# Patient Record
Sex: MALE | Race: WHITE | Employment: FULL TIME | URBAN - METROPOLITAN AREA
[De-identification: names, ages, dates, MRNs, and addresses within clinical notes are randomized per-mention and may not be internally consistent; named-entity substitution may affect disease eponyms.]

---

## 2017-04-17 ENCOUNTER — TRANSFERRED RECORDS (OUTPATIENT)
Dept: HEALTH INFORMATION MANAGEMENT | Facility: CLINIC | Age: 52
End: 2017-04-17

## 2018-04-01 ENCOUNTER — OFFICE VISIT (OUTPATIENT)
Dept: URGENT CARE | Facility: URGENT CARE | Age: 53
End: 2018-04-01
Payer: COMMERCIAL

## 2018-04-01 VITALS
WEIGHT: 221 LBS | BODY MASS INDEX: 29.97 KG/M2 | OXYGEN SATURATION: 98 % | RESPIRATION RATE: 18 BRPM | SYSTOLIC BLOOD PRESSURE: 155 MMHG | HEART RATE: 81 BPM | TEMPERATURE: 97.5 F | DIASTOLIC BLOOD PRESSURE: 96 MMHG

## 2018-04-01 DIAGNOSIS — E11.8 TYPE 2 DIABETES MELLITUS WITH COMPLICATION, WITHOUT LONG-TERM CURRENT USE OF INSULIN (H): ICD-10-CM

## 2018-04-01 DIAGNOSIS — R03.0 ELEVATED BP WITHOUT DIAGNOSIS OF HYPERTENSION: ICD-10-CM

## 2018-04-01 DIAGNOSIS — J06.9 ACUTE URI: Primary | ICD-10-CM

## 2018-04-01 PROCEDURE — 99203 OFFICE O/P NEW LOW 30 MIN: CPT | Performed by: PHYSICIAN ASSISTANT

## 2018-04-01 ASSESSMENT — ENCOUNTER SYMPTOMS
DIARRHEA: 0
MYALGIAS: 0
VOMITING: 0
SHORTNESS OF BREATH: 0
NAUSEA: 0
FOCAL WEAKNESS: 0
CHILLS: 0
HEADACHES: 0
FEVER: 1
ABDOMINAL PAIN: 0
DOUBLE VISION: 0
DYSURIA: 0
COUGH: 1

## 2018-04-01 NOTE — PROGRESS NOTES
HPI  April 1, 2018    HPI: Harsha Calderon is a 52 year old male with hx Type 2 DM who complains of moderate fatigue, fever, cough, & congestion onset 4 days ago. Tmax 101 F, no fever the past couple of days. Symptoms are constant in duration. Denies myalgias, HA, CP, SOB, abd pain, N/V/D, rash, or any other symptoms. Patient's friend has pneumonia recently.    DM is diet controlled.  Pt states his BP is elevated on & off and he is working on dietary changes for this.    Past Medical History:   Diagnosis Date     Allergic rhinitis     dust     Past Surgical History:   Procedure Laterality Date     SINUS SURGERY  2004     TONSILLECTOMY  1987     Social History   Substance Use Topics     Smoking status: Never Smoker     Smokeless tobacco: Never Used     Alcohol use 6.0 oz/week     12 Standard drinks or equivalent per week     Patient Active Problem List   Diagnosis     CARDIOVASCULAR SCREENING; LDL GOAL LESS THAN 160     Allergic rhinitis     Elevated BP without diagnosis of hypertension     Type 2 diabetes mellitus with complication, without long-term current use of insulin (H)     Family History   Problem Relation Age of Onset     DIABETES No family hx of      CANCER No family hx of      C.A.D. No family hx of         Problem list, Medication list, Allergies, and Medical/Social/Surgical histories reviewed in [facility] and updated as appropriate.      Review of Systems   Constitutional: Positive for fever and malaise/fatigue. Negative for chills.   HENT: Positive for congestion.    Eyes: Negative for double vision.   Respiratory: Positive for cough. Negative for shortness of breath.    Cardiovascular: Negative for chest pain.   Gastrointestinal: Negative for abdominal pain, diarrhea, nausea and vomiting.   Genitourinary: Negative for dysuria.   Musculoskeletal: Negative for myalgias.   Skin: Negative for rash.   Neurological: Negative for focal weakness and headaches.   All other systems reviewed and are  negative.      Physical Exam   Constitutional: He is oriented to person, place, and time and well-developed, well-nourished, and in no distress.   HENT:   Head: Normocephalic and atraumatic.   Right Ear: Tympanic membrane, external ear and ear canal normal.   Left Ear: Tympanic membrane, external ear and ear canal normal.   Nose: Nose normal.   Mouth/Throat: Uvula is midline, oropharynx is clear and moist and mucous membranes are normal.   Cardiovascular: Normal rate, regular rhythm and normal heart sounds.    Pulmonary/Chest: Effort normal and breath sounds normal.   Musculoskeletal: Normal range of motion.   Neurological: He is alert and oriented to person, place, and time. Gait normal.   Skin: Skin is warm and dry.   Nursing note and vitals reviewed.      Vital Signs  BP (!) 155/96 (BP Location: Right arm, Patient Position: Chair, Cuff Size: Adult Large)  Pulse 81  Temp 97.5  F (36.4  C) (Tympanic)  Resp 18  Wt 221 lb (100.2 kg)  SpO2 98%  BMI 29.97 kg/m2     Diagnostic Test Results:  none     ASSESSMENT/PLAN      ICD-10-CM    1. Acute URI J06.9    2. Elevated BP without diagnosis of hypertension R03.0    3. Type 2 diabetes mellitus with complication, without long-term current use of insulin (H) E11.8         Lungs CTAB, afebrile, no indication of pneumonia. Most c/w viral URI, supportive treatments discussed. Pt declines Rx for cough suppressant.    F/u with PCP for BP.  DM well controlled per patient.    I have discussed any lab or imaging results, the patient's diagnosis, and my plan of treatment with the patient and/or family. Patient is aware to come back in if with worsening symptoms or if no relief despite treatment plan.  Patient voiced understanding and had no further questions.       Follow Up: Return if symptoms worsen or fail to improve.    ONIEL Cuellar, [facility]

## 2018-04-01 NOTE — MR AVS SNAPSHOT
"              After Visit Summary   4/1/2018    Harsha Calderon    MRN: [id number]           Patient Information     Date Of Birth          1965        Visit Information        Provider Department      4/1/2018 12:25 PM Carole Whitfield PA-C [facility]        Today's Diagnoses     Acute URI    -  1    Elevated BP without diagnosis of hypertension        Type 2 diabetes mellitus with complication, without long-term current use of insulin (H)           Follow-ups after your visit        Follow-up notes from your care team     Return if symptoms worsen or fail to improve.      Who to contact     If you have questions or need follow up information about today's clinic visit or your schedule please contact [facility] directly at [phone].  Normal or non-critical lab and imaging results will be communicated to you by Caremergehart, letter or phone within 4 business days after the clinic has received the results. If you do not hear from us within 7 days, please contact the clinic through Caremergehart or phone. If you have a critical or abnormal lab result, we will notify you by phone as soon as possible.  Submit refill requests through YUPIQ or call your pharmacy and they will forward the refill request to us. Please allow 3 business days for your refill to be completed.          Additional Information About Your Visit        MyChart Information     YUPIQ lets you send messages to your doctor, view your test results, renew your prescriptions, schedule appointments and more. To sign up, go to [email or web address] . Click on \"Log in\" on the left side of the screen, which will take you to the Welcome page. Then click on \"Sign up Now\" on the right side of the page.     You will be asked to enter the access code listed below, as well as some personal information. Please follow the directions to create your username and password.     Your access code is: [email or web address]  Expires: " 2018  2:46 PM     Your access code will  in 90 days. If you need help or a new code, please call your [facility] clinic or [phone].        Care EveryWhere ID     This is your Care EveryWhere ID. This could be used by other organizations to access your [facility] medical records  [id number]        Your Vitals Were     Pulse Temperature Respirations Pulse Oximetry BMI (Body Mass Index)       81 97.5  F (36.4  C) (Tympanic) 18 98% 29.97 kg/m2        Blood Pressure from Last 3 Encounters:   18 (!) 155/96   11 130/84   03/15/10 140/76    Weight from Last 3 Encounters:   18 221 lb (100.2 kg)   11 210 lb (95.3 kg)   03/15/10 233 lb 6 oz (105.9 kg)              Today, you had the following     No orders found for display       Primary Care Provider Fax #    Physician No Ref-Primary [phone]       No address on file        Equal Access to Services     ESTRADA BAILEY : Hadii charline mendez hadasho Soomaali, waaxda luqadaha, qaybta kaalmada adeegyada, waxay andrey crews . So [facility] [phone].    ATENCIÓN: Si habla español, tiene a craig disposición servicios gratuitos de asistencia lingüística. Llame al [phone].    We comply with applicable federal civil rights laws and [place] laws. We do not discriminate on the basis of race, color, national origin, age, disability, sex, sexual orientation, or gender identity.            Thank you!     Thank you for choosing [facility]  for your care. Our goal is always to provide you with excellent care. Hearing back from our patients is one way we can continue to improve our services. Please take a few minutes to complete the written survey that you may receive in the mail after your visit with us. Thank you!             Your Updated Medication List - Protect others around you: Learn how to safely use, store and throw away your medicines at www.disposemymeds.org.      Notice  As of 2018  2:46 PM    You have not  been prescribed any medications.

## 2018-04-25 ENCOUNTER — TELEPHONE (OUTPATIENT)
Dept: OTHER | Facility: CLINIC | Age: 53
End: 2018-04-25

## 2018-04-25 NOTE — TELEPHONE ENCOUNTER
4/25/2018    Call Regarding Onboarding [facility] CHO[facility]     Attempt 1    Message on voicemail     Comments:       Outreach   SB

## 2018-05-22 NOTE — TELEPHONE ENCOUNTER
5/22/2018    [facility] Choices- on-boarded.         Outreach ,  Rory Klein

## 2018-06-04 ENCOUNTER — TELEPHONE (OUTPATIENT)
Dept: FAMILY MEDICINE | Facility: CLINIC | Age: 53
End: 2018-06-04

## 2018-06-04 ENCOUNTER — OFFICE VISIT (OUTPATIENT)
Dept: FAMILY MEDICINE | Facility: CLINIC | Age: 53
End: 2018-06-04
Payer: COMMERCIAL

## 2018-06-04 ENCOUNTER — APPOINTMENT (OUTPATIENT)
Dept: GENERAL RADIOLOGY | Facility: CLINIC | Age: 53
End: 2018-06-04
Attending: EMERGENCY MEDICINE
Payer: COMMERCIAL

## 2018-06-04 ENCOUNTER — HOSPITAL ENCOUNTER (EMERGENCY)
Facility: CLINIC | Age: 53
Discharge: HOME | End: 2018-06-04
Attending: EMERGENCY MEDICINE | Admitting: EMERGENCY MEDICINE
Payer: COMMERCIAL

## 2018-06-04 VITALS
HEIGHT: 72 IN | DIASTOLIC BLOOD PRESSURE: 106 MMHG | HEART RATE: 86 BPM | RESPIRATION RATE: 16 BRPM | WEIGHT: 233 LBS | TEMPERATURE: 98.3 F | SYSTOLIC BLOOD PRESSURE: 154 MMHG | BODY MASS INDEX: 31.56 KG/M2 | OXYGEN SATURATION: 95 %

## 2018-06-04 VITALS
TEMPERATURE: 97.4 F | HEIGHT: 72 IN | BODY MASS INDEX: 31.56 KG/M2 | HEART RATE: 81 BPM | DIASTOLIC BLOOD PRESSURE: 97 MMHG | SYSTOLIC BLOOD PRESSURE: 144 MMHG | OXYGEN SATURATION: 97 % | WEIGHT: 233 LBS

## 2018-06-04 DIAGNOSIS — I45.10 RIGHT BUNDLE BRANCH BLOCK: ICD-10-CM

## 2018-06-04 DIAGNOSIS — R07.9 ACUTE CHEST PAIN: Primary | ICD-10-CM

## 2018-06-04 DIAGNOSIS — R07.89 ATYPICAL CHEST PAIN: ICD-10-CM

## 2018-06-04 DIAGNOSIS — R03.0 ELEVATED BLOOD PRESSURE READING: ICD-10-CM

## 2018-06-04 LAB
ALBUMIN SERPL-MCNC: 4.2 G/DL (ref 3.4–5)
ALP SERPL-CCNC: 49 U/L (ref 40–150)
ALT SERPL W P-5'-P-CCNC: 37 U/L (ref 0–70)
ANION GAP SERPL CALCULATED.3IONS-SCNC: 6 MMOL/L (ref 3–14)
AST SERPL W P-5'-P-CCNC: 10 U/L (ref 0–45)
BASOPHILS # BLD AUTO: 0 10E9/L (ref 0–0.2)
BASOPHILS NFR BLD AUTO: 0.2 %
BILIRUB SERPL-MCNC: 0.4 MG/DL (ref 0.2–1.3)
BUN SERPL-MCNC: 17 MG/DL (ref 7–30)
CALCIUM SERPL-MCNC: 9 MG/DL (ref 8.5–10.1)
CHLORIDE SERPL-SCNC: 100 MMOL/L (ref 94–109)
CO2 SERPL-SCNC: 30 MMOL/L (ref 20–32)
CREAT SERPL-MCNC: 0.7 MG/DL (ref 0.66–1.25)
DIFFERENTIAL METHOD BLD: NORMAL
EOSINOPHIL # BLD AUTO: 0.1 10E9/L (ref 0–0.7)
EOSINOPHIL NFR BLD AUTO: 1.9 %
ERYTHROCYTE [DISTWIDTH] IN BLOOD BY AUTOMATED COUNT: 12.2 % (ref 10–15)
GFR SERPL CREATININE-BSD FRML MDRD: >90 ML/MIN/1.7M2
GLUCOSE SERPL-MCNC: 197 MG/DL (ref 70–99)
HCT VFR BLD AUTO: 42.8 % (ref 40–53)
HGB BLD-MCNC: 15.3 G/DL (ref 13.3–17.7)
IMM GRANULOCYTES # BLD: 0 10E9/L (ref 0–0.4)
IMM GRANULOCYTES NFR BLD: 0.5 %
LYMPHOCYTES # BLD AUTO: 2 10E9/L (ref 0.8–5.3)
LYMPHOCYTES NFR BLD AUTO: 31.5 %
MCH RBC QN AUTO: 30.7 PG (ref 26.5–33)
MCHC RBC AUTO-ENTMCNC: 35.7 G/DL (ref 31.5–36.5)
MCV RBC AUTO: 86 FL (ref 78–100)
MONOCYTES # BLD AUTO: 0.6 10E9/L (ref 0–1.3)
MONOCYTES NFR BLD AUTO: 8.9 %
NEUTROPHILS # BLD AUTO: 3.6 10E9/L (ref 1.6–8.3)
NEUTROPHILS NFR BLD AUTO: 57 %
NRBC # BLD AUTO: 0 10*3/UL
NRBC BLD AUTO-RTO: 0 /100
PLATELET # BLD AUTO: 169 10E9/L (ref 150–450)
POTASSIUM SERPL-SCNC: 4.2 MMOL/L (ref 3.4–5.3)
PROT SERPL-MCNC: 7.7 G/DL (ref 6.8–8.8)
RBC # BLD AUTO: 4.99 10E12/L (ref 4.4–5.9)
SODIUM SERPL-SCNC: 136 MMOL/L (ref 133–144)
TROPONIN I SERPL-MCNC: <0.015 UG/L (ref 0–0.04)
WBC # BLD AUTO: 6.3 10E9/L (ref 4–11)

## 2018-06-04 PROCEDURE — 93005 ELECTROCARDIOGRAM TRACING: CPT

## 2018-06-04 PROCEDURE — 80053 COMPREHEN METABOLIC PANEL: CPT | Performed by: EMERGENCY MEDICINE

## 2018-06-04 PROCEDURE — 71046 X-RAY EXAM CHEST 2 VIEWS: CPT

## 2018-06-04 PROCEDURE — 85025 COMPLETE CBC W/AUTO DIFF WBC: CPT | Performed by: EMERGENCY MEDICINE

## 2018-06-04 PROCEDURE — 99285 EMERGENCY DEPT VISIT HI MDM: CPT | Mod: 25

## 2018-06-04 PROCEDURE — 84484 ASSAY OF TROPONIN QUANT: CPT | Performed by: EMERGENCY MEDICINE

## 2018-06-04 PROCEDURE — 99213 OFFICE O/P EST LOW 20 MIN: CPT | Performed by: INTERNAL MEDICINE

## 2018-06-04 RX ORDER — PREDNISOLONE ACETATE 10 MG/ML
1 SUSPENSION/ DROPS OPHTHALMIC EVERY MORNING
Refills: 0 | COMMUNITY
Start: 2018-04-20

## 2018-06-04 RX ORDER — METFORMIN HCL 500 MG
1 TABLET, EXTENDED RELEASE 24 HR ORAL DAILY
COMMUNITY
Start: 2017-12-05

## 2018-06-04 RX ORDER — GLIPIZIDE 5 MG/1
1 TABLET, FILM COATED, EXTENDED RELEASE ORAL DAILY
Refills: 0 | COMMUNITY
Start: 2017-12-05

## 2018-06-04 ASSESSMENT — ENCOUNTER SYMPTOMS
LIGHT-HEADEDNESS: 0
VOMITING: 0
NAUSEA: 0
PALPITATIONS: 0
SHORTNESS OF BREATH: 0

## 2018-06-04 NOTE — ED PROVIDER NOTES
History     Chief Complaint:  Chest Pain    The history is provided by the patient.      Harsha Calderon is a hypertensive 52 year old type II diabetic male, who presents for evaluation of chest pain. The patient reports onset of mild epigastric chest pain that was intermittent for the last week until 2 days ago. 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 Patient does note that he was wearing multiple shirts, and that he did not feel that he was sweating excessively. He took Mylanta later that night, which he states alleviated his symptoms immediately. Patient has not felt the chest pain since Saturday night after taking Mylanta. Patient notes that he typically felt this chest pain around lunch time and while sitting down. Exercise does not seem to exacerbate his pain. Patient was evaluated in clinic earlier today and told to seek further evaluation here in the emergency department. Of note, the patient does have a stress test set up for 2 days from now that was ordered by his PCP.    Patient was diagnosed with type II diabetes ~1.5 years ago, and now takes Glipizide and Metformin 1x daily. Patient states that he only recently continued taking these medications 1 month ago as he had his diet under control and did not feel that he needed to. Patient does not take medication for his hypertension. Patient denies pain, pressure, or tightness on presentation, nausea, vomiting, discomfort with breathing, lightheadedness or dizziness, palpitations, swelling in legs or pain in calves, history of heart trouble or diabetes, or other complaint.    CARDIAC RISK FACTORS:  Sex:    Male  Tobacco:   negative  Hypertension:   positive  Hyperlipidemia:  negative  Diabetes:   positive  Family History:  negative     PE/DVT RISK FACTORS:  Hormones:   negative  Cancer:   negative    Travel:   Negative  Surgery:   Negative  Other immobilization: negative   Personal history:  negative     Allergies:  No known drug allergies     Medications:    Metformin  Prednisolone    Past Medical History:    Elevated BP w/o hypertension  Type II diabetes  Allergic rhinitis  CV screening    Past Surgical History:    Sinus surgery  Tonsillectomy    Family History:    GERD - mother and father    Social History:  Presents with spouse   Tobacco use: never smoker  Alcohol use: one drink / week  PCP: Physician No Ref-Primary     Marital Status:       Review of Systems   Respiratory: Negative for shortness of breath.    Cardiovascular: Positive for chest pain. Negative for palpitations and leg swelling.   Gastrointestinal: Negative for nausea and vomiting.   Neurological: Negative for light-headedness.   All other systems reviewed and are negative.    Physical Exam     Patient Vitals for the past 24 hrs:   BP Temp Pulse Resp SpO2 Height Weight   06/04/18 1719 (!) 173/103 98.3  F (36.8  C) 86 18 98 % 1.829 m (6') 105.7 kg (233 lb)      Physical Exam  General: Well appearing, nontoxic. Resting comfortably  Head:  Scalp, face, and head appear normal  Eyes:  Pupils are equal, round    Conjunctivae non-injected and sclerae white  ENT:    The external nose is normal    Pinnae are normal  Neck:  Normal range of motion    There is no rigidity noted    Trachea is in the midline  CV:  Regular rate and rhythm     Normal S1/S2, no S3/S4    No murmur or rub. Radial pulses 2+ bilaterally. Extremities warm and well perfused.  Resp:  Lungs are clear and equal bilaterally    There is no tachypnea    No increased work of breathing    No rales, wheezing, or rhonchi  GI:  Abdomen is soft, no rigidity or guarding    No distension, or mass    No tenderness or rebound tenderness   MS:  Normal muscular tone    Symmetric motor strength    No lower extremity edema, swelling or tenderness to palpation   Skin:  No rash or acute skin  lesions noted  Neuro: Awake and alert    Speech is normal and fluent    Moves all extremities spontaneously  Psych:  Normal affect.  Appropriate interactions.     Emergency Department Course   ECG (17:18:41):  Rate 83 bpm. OH interval 152. QRS duration 150. QT/QTc 384/462. P-R-T axes 50 67 27. Normal sinus rhythm. Right bundle branch block. Abnormal ECG. Agree with computer interpretation.  Interpreted at 1742 by Ab Huntley MD.     Imaging:  Radiographic findings were communicated with the patient and family who voiced understanding of the findings.    XR Chest, 2 views:  IMPRESSION: Negative.     Imaging independently reviewed and agree with radiologist interpretation.     Laboratory:  CBC: AWNL (WBC 6.3, HGB 15.3, )  CMP:  (H) o/w WNL (Creatinine 0.70)  1817: Troponin: <0.015       Emergency Department Course:  Past medical records, nursing notes, and vitals reviewed.  1728: I performed an exam of the patient and obtained history, as documented above.    The patient was sent for a chest x-ray while in the emergency department, findings above.   Blood drawn. This was sent to the lab for further testing, results above.   EKG was taken here in the ED, results as above.     1825: I rechecked the patient. Findings and plan explained to the Patient and spouse. Patient discharged home with instructions regarding supportive care, medications, and reasons to return. The importance of close follow-up was reviewed.      Impression & Plan      Medical Decision Making:  Harsha Calderon is a 52 year old male who presents for evaluation of chest pain.  This pain was intermittent through the last week, increased somewhat in intensity 2 days ago while the patient was moving band equipment, but since dissipated and has not returned after administration of Mylanta later that night. He denies any pain or discomfort today or yesterday. Patient low risk by HEART Score. CXR and troponin unremarkable. ECG with RBBB but  otherwise no evidence to suggest ischemia. There has been no progression of symptoms or other new concerning symptoms. A broad differential was of course considered. H&P not consistent with PE, Acute aortic emergency. No evidence of cardiomegaly, PTX, pneumonia or other acute disease. Discussed HEART score with patient and shared decision making regarding disposition and further workup was done. Patient is already scheduled for an outpatient stress echocardiogram in 2 days, and I encouraged him to keep this appointment. I feel that it is unlikely that his pain is cardiac in etiology. The patient agrees with the plan and all questions were answered. Return precautions were discussed with patient. The patient's questions were answered and the patient was agreeable with discharge.     Diagnosis:    ICD-10-CM   1. Atypical chest pain R07.89   2. Elevated blood pressure reading R03.0   3. Right bundle branch block I45.10       Disposition:  Discharged to home with plan as outlined.       Edson DELEON, am serving as a scribe at 5:37 PM on 6/4/2018 to document services personally performed by Ab Huntley MD based on my observations and the provider's statements to me.    6/4/2018    EMERGENCY DEPARTMENT     Ab Huntley MD  06/05/18 9091

## 2018-06-04 NOTE — ED AVS SNAPSHOT
Emergency Department    [address] [facility] [address]    Phone:  [phone]    Fax:  [phone]                                       Harsha Calderon   MRN: [id number]    Department:   Emergency Department   Date of Visit:  6/4/2018           Patient Information     Date Of Birth          1965        Your diagnoses for this visit were:     Atypical chest pain     Elevated blood pressure reading     Right bundle branch block        You were seen by Ab Huntley MD.      Follow-up Information     Follow up with Berkley Velasquez MD. Schedule an appointment as soon as possible for a visit in 3 days.    Specialty:  Internal Medicine    Why:  For close follow up    Contact information:    [address]  [facility] [id number] [phone]          Follow up with [facility]. Schedule an appointment as soon as possible for a visit in 3 days.    Why:  Keep your appointment for the stress test and call to follow up with a cardiologist.    Contact information:    [facility]  [address]  [address]  [place] [address]  Appointments: [phone]        Go to  Emergency Department.    Specialty:  EMERGENCY MEDICINE    Why:  If symptoms worsen    Contact information:    [address] [facility] [address] [phone]        Discharge Instructions       Start taking one baby aspirin (81mg) daily until you have your stress test and follow up with the cardiologist. Monitor your blood pressure and keep track of what your blood pressure is at home as this provides a more accurate measure and trend of what your blood pressure is when you are not at the doctor's office/hospital. Blood pressure monitors can be purchased and any pharmacy or the pharmacy will check it for you.    Discharge Instructions  Chest Pain    You have been seen today for chest pain or discomfort.  At this time, your provider has found no  signs that your chest pain is due to a serious or life-threatening condition, (or you have declined more testing and/or admission to the hospital). However, sometimes there is a serious problem that does not show up right away. Your evaluation today may not be complete and you may need further testing and evaluation.     Generally, every Emergency Department visit should have a follow-up clinic visit with either a primary or a specialty clinic/provider. Please follow-up as instructed by your emergency provider today.  Return to the Emergency Department if:    Your chest pain changes, gets worse, starts to happen more often, or comes with less activity.    You are newly short of breath.    You get very weak or tired.    You pass out or faint.    You have any new symptoms, like fever, cough, numb legs, or you cough up blood.    You have anything else that worries you.    Until you follow-up with your regular provider, please do the following:    Take one aspirin daily unless you have an allergy or are told not to by your provider.    If a stress test appointment has been made, go to the appointment.    If you have questions, contact your regular provider.    Follow-up with your regular provider/clinic as directed; this is very important.    If you were given a prescription for medicine here today, be sure to read all of the information (including the package insert) that comes with your prescription.  This will include important information about the medicine, its side effects, and any warnings that you need to know about.  The pharmacist who fills the prescription can provide more information and answer questions you may have about the medicine.  If you have questions or concerns that the pharmacist cannot address, please call or return to the Emergency Department.       Remember that you can always come back to the Emergency Department if you are not able to see your regular provider in the amount of time listed  above, if you get any new symptoms, or if there is anything that worries you.    Discharge Instructions  Hypertension - High Blood Pressure    During you visit to the Emergency Department, your blood pressure was higher than the recommended blood pressure.  This may be related to stress, pain, medication or other temporary conditions. In these cases, your blood pressure may return to normal on its own. If you have a history of high blood pressure, you may need to have your provider adjust your medications. Sometimes, your high measurement here may indicate that you have developed high blood pressure that will stay high unless it is treated. As a general rule, high blood pressure causes problems over years rather than days, weeks, or months. So, while it is important to treat blood pressure, it is rarely important to treat blood pressure immediately. Occasionally we will begin a medication in the Emergency Department; more often we will recommend close follow-up for medications with a primary doctor/clinic.    Generally, every Emergency Department visit should have a follow-up clinic visit with either a primary or a specialty clinic/provider. Please follow-up as instructed by your emergency provider today.    Return to the Emergency Department if you start to have:    A severe headache.    Chest pain.    Shortness of breath.    Weakness or numbness that affects one part of the body.    Confusion.    Vision changes.    Significant swelling of legs and/or eyes.    A reaction to any medication started in the Emergency Department.    What can I do to help myself?    Avoid alcohol.    Take any blood pressure medicine that you are prescribed.    Get a good night s sleep.    Lower your salt intake.    Exercise.    Lose weight.    Manage stress.    See your doctor regularly    If blood pressure medication was started in the Emergency Department:    The medicine may not have an immediate effect. The body and brain determine  what blood pressure you have. The medicine s job is to retrain the body s  thermostat  to a lower blood pressure.    You will need to follow up with your provider to see how this medicine is working for you.  If you were given a prescription for medicine here today, be sure to read all of the information (including the package insert) that comes with your prescription.  This will include important information about the medicine, its side effects, and any warnings that you need to know about.  The pharmacist who fills the prescription can provide more information and answer questions you may have about the medicine.  If you have questions or concerns that the pharmacist cannot address, please call or return to the Emergency Department.   Remember that you can always come back to the Emergency Department if you are not able to see your regular provider in the amount of time listed above, if you get any new symptoms, or if there is anything that worries you.        Your next 10 appointments already scheduled     Jun 06, 2018  9:00 AM CDT   New Visit with Sushil Vasquez MD   [facility] ([facility])    [address] [place] [address] [phone] OPT 2              24 Hour Appointment Hotline       To make an appointment at any [facility], call [phone] ([phone]). If you don't have a family doctor or clinic, we will help you find one. [facility] are conveniently located to serve the needs of you and your family.             Review of your medicines      Our records show that you are taking the medicines listed below. If these are incorrect, please call your family doctor or clinic.        Dose / Directions Last dose taken    glipiZIDE 5 MG 24 hr tablet   Commonly known as:  GLUCOTROL XL   Dose:  1 tablet        Take 1 tablet by mouth daily   Refills:  0        metFORMIN 500 MG 24 hr tablet   Commonly known as:  GLUCOPHAGE-XR    Dose:  1 tablet        Take 1 tablet by mouth daily   Refills:  0        prednisoLONE acetate 1 % ophthalmic susp   Commonly known as:  PRED FORTE   Dose:  1 drop        Place 1 drop into both eyes every morning   Refills:  0                Procedures and tests performed during your visit     CBC with platelets + differential    Comprehensive metabolic panel    EKG 12-lead, tracing only    Troponin I (now)    XR Chest 2 Views      Orders Needing Specimen Collection     None      Pending Results     Date and Time Order Name Status Description    6/4/2018 1753 XR Chest 2 Views Preliminary             Pending Culture Results     No orders found from 6/2/2018 to 6/5/2018.            Pending Results Instructions     If you had any lab results that were not finalized at the time of your Discharge, you can call the ED Lab Result RN at [phone]. You will be contacted by this team for any positive Lab results or changes in treatment. The nurses are available 7 days a week from 10A to 6:30P.  You can leave a message 24 hours per day and they will return your call.        Test Results From Your Hospital Stay        6/4/2018  6:01 PM      Component Results     Component Value Ref Range & Units Status    WBC 6.3 4.0 - 11.0 10e9/L Final    RBC Count 4.99 4.4 - 5.9 10e12/L Final    Hemoglobin 15.3 13.3 - 17.7 g/dL Final    Hematocrit 42.8 40.0 - 53.0 % Final    MCV 86 78 - 100 fl Final    MCH 30.7 26.5 - 33.0 pg Final    MCHC 35.7 31.5 - 36.5 g/dL Final    RDW 12.2 10.0 - 15.0 % Final    Platelet Count 169 150 - 450 10e9/L Final    Diff Method Automated Method  Final    % Neutrophils 57.0 % Final    % Lymphocytes 31.5 % Final    % Monocytes 8.9 % Final    % Eosinophils 1.9 % Final    % Basophils 0.2 % Final    % Immature Granulocytes 0.5 % Final    Nucleated RBCs 0 0 /100 Final    Absolute Neutrophil 3.6 1.6 - 8.3 10e9/L Final    Absolute Lymphocytes 2.0 0.8 - 5.3 10e9/L Final    Absolute Monocytes 0.6 0.0 - 1.3 10e9/L Final     Absolute Eosinophils 0.1 0.0 - 0.7 10e9/L Final    Absolute Basophils 0.0 0.0 - 0.2 10e9/L Final    Abs Immature Granulocytes 0.0 0 - 0.4 10e9/L Final    Absolute Nucleated RBC 0.0  Final         6/4/2018  6:17 PM      Component Results     Component Value Ref Range & Units Status    Sodium 136 133 - 144 mmol/L Final    Potassium 4.2 3.4 - 5.3 mmol/L Final    Chloride 100 94 - 109 mmol/L Final    Carbon Dioxide 30 20 - 32 mmol/L Final    Anion Gap 6 3 - 14 mmol/L Final    Glucose 197 (H) 70 - 99 mg/dL Final    Urea Nitrogen 17 7 - 30 mg/dL Final    Creatinine 0.70 0.66 - 1.25 mg/dL Final    GFR Estimate >90 >60 mL/min/1.7m2 Final    Non  GFR Calc    GFR Estimate If Black >90 >60 mL/min/1.7m2 Final    African American GFR Calc    Calcium 9.0 8.5 - 10.1 mg/dL Final    Bilirubin Total 0.4 0.2 - 1.3 mg/dL Final    Albumin 4.2 3.4 - 5.0 g/dL Final    Protein Total 7.7 6.8 - 8.8 g/dL Final    Alkaline Phosphatase 49 40 - 150 U/L Final    ALT 37 0 - 70 U/L Final    AST 10 0 - 45 U/L Final         6/4/2018  6:17 PM      Component Results     Component Value Ref Range & Units Status    Troponin I ES <0.015 0.000 - 0.045 ug/L Final    The 99th percentile for upper reference range is 0.045 ug/L.  Troponin values   in the range of 0.045 - 0.120 ug/L may be associated with risks of adverse   clinical events.           6/4/2018  6:13 PM      Narrative     CHEST TWO VIEWS     6/4/2018 6:11 PM     HISTORY: Chest pain.    COMPARISON: None.    FINDINGS: Heart size normal. Lungs clear.        Impression     IMPRESSION: Negative.                 Clinical Quality Measure: Blood Pressure Screening     Your blood pressure was checked while you were in the emergency department today. The last reading we obtained was  BP: (!) 173/103 . Please read the guidelines below about what these numbers mean and what you should do about them.  If your systolic blood pressure (the top number) is less than 120 and your diastolic blood  pressure (the bottom number) is less than 80, then your blood pressure is normal. There is nothing more that you need to do about it.  If your systolic blood pressure (the top number) is 120-139 or your diastolic blood pressure (the bottom number) is 80-89, your blood pressure may be higher than it should be. You should have your blood pressure rechecked within a year by a primary care provider.  If your systolic blood pressure (the top number) is 140 or greater or your diastolic blood pressure (the bottom number) is 90 or greater, you may have high blood pressure. High blood pressure is treatable, but if left untreated over time it can put you at risk for heart attack, stroke, or kidney failure. You should have your blood pressure rechecked by a primary care provider within the next 4 weeks.  If your provider in the emergency department today gave you specific instructions to follow-up with your doctor or provider even sooner than that, you should follow that instruction and not wait for up to 4 weeks for your follow-up visit.        Thank you for choosing [facility]       Thank you for choosing [facility] for your care. Our goal is always to provide you with excellent care. Hearing back from our patients is one way we can continue to improve our services. Please take a few minutes to complete the written survey that you may receive in the mail after you visit with us. Thank you!        [employer] Information     [employer] gives you secure access to your electronic health record. If you see a primary care provider, you can also send messages to your care team and make appointments. If you have questions, please call your primary care clinic.  If you do not have a primary care provider, please call [phone] and they will assist you.        Care EveryWhere ID     This is your Care EveryWhere ID. This could be used by other organizations to access your [facility] medical records  [id number]        Equal Access to Services      LEONID BAILEY : Hadii charline Burroughs, waaxda luqadaha, qaybta kaalmada reshma, daisha osman. So [facility] [phone].    ATENCIÓN: Si habla español, tiene a craig disposición servicios gratuitos de asistencia lingüística. Llame al [phone].    We comply with applicable federal civil rights laws and [place] laws. We do not discriminate on the basis of race, color, national origin, age, disability, sex, sexual orientation, or gender identity.            After Visit Summary       This is your record. Keep this with you and show to your community pharmacist(s) and doctor(s) at your next visit.

## 2018-06-04 NOTE — MR AVS SNAPSHOT
After Visit Summary   6/4/2018    Harsha Calderon    MRN: [id number]           Patient Information     Date Of Birth          1965        Visit Information        Provider Department      6/4/2018 4:40 PM Berkley Velasquez MD [facility]        Today's Diagnoses     Acute chest pain    -  1       Follow-ups after your visit        Additional Services     CARDIO  ADULT REFERRAL       [facility] is referring you to Cardiology Services.       The  Representative will assist you in the coordination of your Cardiology care as prescribed by your physician.    The  Representative will call you within 24 hours to help schedule your appointment, or you may contact the  Representative at: [phone].         Type of Referral: New Cardiology Referral            Timeframe requested: 1-2 days       Coverage of these services is subject to the terms and limitations of your health insurance plan.  Please call member services at your health plan with any benefit or coverage questions.      If X-rays, CT or MRI's have been performed, please contact the facility where they were done to arrange for , prior to your scheduled appointment.  Please bring this referral request to your appointment and present it to your specialist.                  Your next 10 appointments already scheduled     Jun 06, 2018  9:00 AM CDT   New Visit with Sushil Vasquez MD   [facility] ([facility])    [address] [place] [address] [phone] OPT 2              Who to contact     If you have questions or need follow up information about today's clinic visit or your schedule please contact [facility] directly at [phone].  Normal or non-critical lab and imaging results will be communicated to you by MyChart, letter or phone within 4 business days after the clinic has received the  results. If you do not hear from us within 7 days, please contact the clinic through [employer] or phone. If you have a critical or abnormal lab result, we will notify you by phone as soon as possible.  Submit refill requests through [employer] or call your pharmacy and they will forward the refill request to us. Please allow 3 business days for your refill to be completed.          Additional Information About Your Visit        [email or web address] Information     [employer] gives you secure access to your electronic health record. If you see a primary care provider, you can also send messages to your care team and make appointments. If you have questions, please call your primary care clinic.  If you do not have a primary care provider, please call [phone] and they will assist you.        Care EveryWhere ID     This is your Care EveryWhere ID. This could be used by other organizations to access your [facility] medical records  [id number]        Your Vitals Were     Pulse Temperature Height Pulse Oximetry BMI (Body Mass Index)       81 97.4  F (36.3  C) (Tympanic) 6' (1.829 m) 97% 31.6 kg/m2        Blood Pressure from Last 3 Encounters:   06/04/18 (!) 144/97   04/01/18 (!) 155/96   08/25/11 130/84    Weight from Last 3 Encounters:   06/04/18 233 lb (105.7 kg)   04/01/18 221 lb (100.2 kg)   08/25/11 210 lb (95.3 kg)              We Performed the Following     CARDIO  ADULT REFERRAL        Primary Care Provider Fax #    Physician No Ref-Primary [phone]       No address on file        Equal Access to Services     [facility] : Hadii aad ku hadasho Soomaali, waaxda luqadaha, qaybta kaalmada adeegyada, waxay andrey crews . So [facility] [phone].    ATENCIÓN: Si habla español, tiene a craig disposición servicios gratuitos de asistencia lingüística. Llame al [phone].    We comply with applicable federal civil rights laws and [place] laws. We do not discriminate on the basis of race, color, national  origin, age, disability, sex, sexual orientation, or gender identity.            Thank you!     Thank you for choosing [facility]  for your care. Our goal is always to provide you with excellent care. Hearing back from our patients is one way we can continue to improve our services. Please take a few minutes to complete the written survey that you may receive in the mail after your visit with us. Thank you!             Your Updated Medication List - Protect others around you: Learn how to safely use, store and throw away your medicines at www.disposemymeds.org.          This list is accurate as of 6/4/18  5:08 PM.  Always use your most recent med list.                   Brand Name Dispense Instructions for use Diagnosis    glipiZIDE 5 MG 24 hr tablet    GLUCOTROL XL     Take 1 tablet by mouth daily        metFORMIN 500 MG 24 hr tablet    GLUCOPHAGE-XR     Take 1 tablet by mouth daily        prednisoLONE acetate 1 % ophthalmic susp    PRED FORTE     Place 1 drop into both eyes every morning

## 2018-06-04 NOTE — TELEPHONE ENCOUNTER
Transferred from Pt Reps:    S: Pt c/o chest pains throughout this week. Not currently having symptoms    B: Was previously dx of Type II Diabetes- Takes glipizide and metformin daily for 2 weeks.     Was previously seen by [facility]     A:     Pt noticed a tightness in chest on Saturday with some sweating while he was moving band equipment.    Did not feel he was over-exerting himself.     Denies shortness of breath with episode  Denies lightheaded/dizziness with episode    Location: Mid-sternal   Denies radiation, denies nausea/vomiting   Alleviating: MyLanta, lying down  Aggravating: unsure of what brought on episodes     Pt states he drinks a lot of soda and spicy foods    R: OV today with Dr. Velasquez, he is currently asymptomatic. If symptoms return, contact clinic. If CP returns with SOB, radiation, N/V. Call 911.     Offered earlier appt, but pt does not currently have a vehicle. His is in the shop until noon.    Tamika NEGRETE RN

## 2018-06-04 NOTE — DISCHARGE INSTRUCTIONS
Start taking one baby aspirin (81mg) daily until you have your stress test and follow up with the cardiologist. Monitor your blood pressure and keep track of what your blood pressure is at home as this provides a more accurate measure and trend of what your blood pressure is when you are not at the doctor's office/hospital. Blood pressure monitors can be purchased and any pharmacy or the pharmacy will check it for you.    Discharge Instructions  Chest Pain    You have been seen today for chest pain or discomfort.  At this time, your provider has found no signs that your chest pain is due to a serious or life-threatening condition, (or you have declined more testing and/or admission to the hospital). However, sometimes there is a serious problem that does not show up right away. Your evaluation today may not be complete and you may need further testing and evaluation.     Generally, every Emergency Department visit should have a follow-up clinic visit with either a primary or a specialty clinic/provider. Please follow-up as instructed by your emergency provider today.  Return to the Emergency Department if:    Your chest pain changes, gets worse, starts to happen more often, or comes with less activity.    You are newly short of breath.    You get very weak or tired.    You pass out or faint.    You have any new symptoms, like fever, cough, numb legs, or you cough up blood.    You have anything else that worries you.    Until you follow-up with your regular provider, please do the following:    Take one aspirin daily unless you have an allergy or are told not to by your provider.    If a stress test appointment has been made, go to the appointment.    If you have questions, contact your regular provider.    Follow-up with your regular provider/clinic as directed; this is very important.    If you were given a prescription for medicine here today, be sure to read all of the information (including the package insert)  that comes with your prescription.  This will include important information about the medicine, its side effects, and any warnings that you need to know about.  The pharmacist who fills the prescription can provide more information and answer questions you may have about the medicine.  If you have questions or concerns that the pharmacist cannot address, please call or return to the Emergency Department.       Remember that you can always come back to the Emergency Department if you are not able to see your regular provider in the amount of time listed above, if you get any new symptoms, or if there is anything that worries you.    Discharge Instructions  Hypertension - High Blood Pressure    During you visit to the Emergency Department, your blood pressure was higher than the recommended blood pressure.  This may be related to stress, pain, medication or other temporary conditions. In these cases, your blood pressure may return to normal on its own. If you have a history of high blood pressure, you may need to have your provider adjust your medications. Sometimes, your high measurement here may indicate that you have developed high blood pressure that will stay high unless it is treated. As a general rule, high blood pressure causes problems over years rather than days, weeks, or months. So, while it is important to treat blood pressure, it is rarely important to treat blood pressure immediately. Occasionally we will begin a medication in the Emergency Department; more often we will recommend close follow-up for medications with a primary doctor/clinic.    Generally, every Emergency Department visit should have a follow-up clinic visit with either a primary or a specialty clinic/provider. Please follow-up as instructed by your emergency provider today.    Return to the Emergency Department if you start to have:    A severe headache.    Chest pain.    Shortness of breath.    Weakness or numbness that affects one  part of the body.    Confusion.    Vision changes.    Significant swelling of legs and/or eyes.    A reaction to any medication started in the Emergency Department.    What can I do to help myself?    Avoid alcohol.    Take any blood pressure medicine that you are prescribed.    Get a good night s sleep.    Lower your salt intake.    Exercise.    Lose weight.    Manage stress.    See your doctor regularly    If blood pressure medication was started in the Emergency Department:    The medicine may not have an immediate effect. The body and brain determine what blood pressure you have. The medicine s job is to retrain the body s  thermostat  to a lower blood pressure.    You will need to follow up with your provider to see how this medicine is working for you.  If you were given a prescription for medicine here today, be sure to read all of the information (including the package insert) that comes with your prescription.  This will include important information about the medicine, its side effects, and any warnings that you need to know about.  The pharmacist who fills the prescription can provide more information and answer questions you may have about the medicine.  If you have questions or concerns that the pharmacist cannot address, please call or return to the Emergency Department.   Remember that you can always come back to the Emergency Department if you are not able to see your regular provider in the amount of time listed above, if you get any new symptoms, or if there is anything that worries you.

## 2018-06-04 NOTE — PROGRESS NOTES
SUBJECTIVE:   Harsha Calderon is a 52 year old male who presents to clinic today for the following health issues:      Chest pain      Chest Pain    Duration:     Since: last Saturday 2 days ago           Specific cause: exertion    Description:      Location of pain: left chest     Character of pain: dull     Pain radiation: none    Intensity: moderate    History:      Pain interferes with job: yes     History of similar pain problems: no     Any previous MRI or X-rays: none     Therapies tried without relief: mylanta    Alleviating factors:      Improved by: rest    Precipitating factors:    Worsened by: exertion    Accompanying Signs & Symptoms: none            Current Medications:     Current Outpatient Prescriptions   Medication Sig Dispense Refill     glipiZIDE (GLUCOTROL XL) 5 MG 24 hr tablet Take 1 tablet by mouth daily  0     metFORMIN (GLUCOPHAGE-XR) 500 MG 24 hr tablet Take 1 tablet by mouth daily       prednisoLONE acetate (PRED FORTE) 1 % ophthalmic susp Place 1 drop into both eyes every morning  0         Allergies:    No Known Allergies         Past Medical History:     Past Medical History:   Diagnosis Date     Allergic rhinitis     dust     Diabetes (H)          Past Surgical History:     Past Surgical History:   Procedure Laterality Date     SINUS SURGERY  2004     TONSILLECTOMY  1987         Family Medical History:     Family History   Problem Relation Age of Onset     DIABETES No family hx of      CANCER No family hx of      C.A.D. No family hx of          Social History:     Social History     Social History     Marital status:      Spouse name: Hilary Smith     Number of children: 0     Years of education: 14     Occupational History      [employer]     Social History Main Topics     Smoking status: Never Smoker     Smokeless tobacco: Never Used     Alcohol use 6.0 oz/week     12 Standard drinks or equivalent per week      Comment: variable      Drug use: No      Sexual activity: Yes     Partners: Female     Other Topics Concern     Not on file     Social History Narrative           Review of System:     Constitutional: Negative for fever or chills  Skin: Negative for rashes  Ears/Nose/Throat: Negative for nasal congestion, sore throat  Respiratory: No shortness of breath, dyspnea on exertion, cough, or hemoptysis  Cardiovascular: Positive for chest pain  Gastrointestinal: Negative for nausea, vomiting  Genitourinary: Negative for dysuria, hematuria  Musculoskeletal: Negative for myalgias  Neurologic: Negative for headaches  Psychiatric: Negative for depression, anxiety  Hematologic/Lymphatic/Immunologic: Negative  Endocrine: Negative  Behavioral: Negative for tobacco use       Physical Exam:   BP (!) 144/97 (BP Location: Right arm, Patient Position: Chair, Cuff Size: Adult Large)  Pulse 81  Temp 97.4  F (36.3  C) (Tympanic)  Ht 6' (1.829 m)  Wt 233 lb (105.7 kg)  SpO2 97%  BMI 31.6 kg/m2    GENERAL: alert and no distress  EYES: eyes grossly normal to inspection, and conjunctivae and sclerae normal  HENT: Normocephalic atraumatic. Nose and mouth without ulcers or lesions  NECK: supple  RESP: lungs clear to auscultation   CV: regular rate and rhythm, normal S1 S2  LYMPH: no peripheral edema   ABDOMEN: nondistended  MS: no gross musculoskeletal defects noted  SKIN: no suspicious lesions or rashes  NEURO: Alert & Oriented x 3.   PSYCH: mentation appears normal, affect normal        ASSESSMENT/PLAN:       (R07.9) Acute chest pain  (primary encounter diagnosis)  Comment: new chest pain on Saturday with exertion concerning for angina symptoms.  Plan: I have ordered CARDIO  ADULT REFERRAL and transferred the patient to the [facility] ER today for further evaluation and management going forward.      Follow Up Plan:     Patient will be transferred from the Internal Medicine clinic to the [facility] ER today for further evaluation and management of  his chest pains symptoms going forward      Berkley Velasquez MD  Internal Medicine  [facility]

## 2018-06-04 NOTE — ED AVS SNAPSHOT
Emergency Department    [address] [place] [id number]    Phone:  [phone]    Fax:  [phone]                                       Harsha Calderon   MRN: [id number]    Department:   Emergency Department   Date of Visit:  6/4/2018           After Visit Summary Signature Page     I have received my discharge instructions, and my questions have been answered. I have discussed any challenges I see with this plan with the nurse or doctor.    ..........................................................................................................................................  Patient/Patient Representative Signature      ..........................................................................................................................................  Patient Representative Print Name and Relationship to Patient    ..................................................               ................................................  Date                                            Time    ..........................................................................................................................................  Reviewed by Signature/Title    ...................................................              ..............................................  Date                                                            Time

## 2018-06-05 ENCOUNTER — TELEPHONE (OUTPATIENT)
Dept: CARDIOLOGY | Facility: CLINIC | Age: 53
End: 2018-06-05

## 2018-06-05 LAB — INTERPRETATION ECG - MUSE: NORMAL

## 2018-06-05 NOTE — TELEPHONE ENCOUNTER
Called and spoke with patient. 5/4/18 note from Dr. Velasquez, noted patient was to have a stress test done in 2 days. Patient states he was not told to have a stress test, just cardiology f//u. No order for stress test found. Will have him discuss with Dr. Vasquez.

## 2018-06-06 ENCOUNTER — OFFICE VISIT (OUTPATIENT)
Dept: CARDIOLOGY | Facility: CLINIC | Age: 53
End: 2018-06-06
Payer: COMMERCIAL

## 2018-06-06 VITALS
SYSTOLIC BLOOD PRESSURE: 143 MMHG | DIASTOLIC BLOOD PRESSURE: 92 MMHG | BODY MASS INDEX: 31.72 KG/M2 | HEART RATE: 82 BPM | HEIGHT: 72 IN | WEIGHT: 234.2 LBS

## 2018-06-06 DIAGNOSIS — R07.89 ATYPICAL CHEST PAIN: ICD-10-CM

## 2018-06-06 DIAGNOSIS — I45.10 RIGHT BUNDLE BRANCH BLOCK: Primary | ICD-10-CM

## 2018-06-06 DIAGNOSIS — R03.0 ELEVATED BP WITHOUT DIAGNOSIS OF HYPERTENSION: ICD-10-CM

## 2018-06-06 DIAGNOSIS — R07.89 OTHER CHEST PAIN: ICD-10-CM

## 2018-06-06 PROCEDURE — 99204 OFFICE O/P NEW MOD 45 MIN: CPT | Performed by: INTERNAL MEDICINE

## 2018-06-06 NOTE — MR AVS SNAPSHOT
After Visit Summary   6/6/2018    Harsha Calderon    MRN: [id number]           Patient Information     Date Of Birth          1965        Visit Information        Provider Department      6/6/2018 9:00 AM Sushil Vasquez MD [facility]        Today's Diagnoses     Right bundle branch block    -  1    Elevated BP without diagnosis of hypertension        Other chest pain        Atypical chest pain           Follow-ups after your visit        Future tests that were ordered for you today     Open Future Orders        Priority Expected Expires Ordered    Exercise Stress Echocardiogram Routine 6/13/2018 6/6/2019 6/6/2018            Who to contact     If you have questions or need follow up information about today's clinic visit or your schedule please contact [facility] directly at [phone].  Normal or non-critical lab and imaging results will be communicated to you by MyChart, letter or phone within 4 business days after the clinic has received the results. If you do not hear from us within 7 days, please contact the clinic through [employer] or phone. If you have a critical or abnormal lab result, we will notify you by phone as soon as possible.  Submit refill requests through Blink or call your pharmacy and they will forward the refill request to us. Please allow 3 business days for your refill to be completed.          Additional Information About Your Visit        MyChart Information     Blink gives you secure access to your electronic health record. If you see a primary care provider, you can also send messages to your care team and make appointments. If you have questions, please call your primary care clinic.  If you do not have a primary care provider, please call [phone] and they will assist you.        Care EveryWhere ID     This is your Care EveryWhere ID. This could be used by other organizations to  access your [facility] medical records  [id number]        Your Vitals Were     Pulse Height BMI (Body Mass Index)             82 1.829 m (6') 31.76 kg/m2          Blood Pressure from Last 3 Encounters:   06/06/18 (!) 143/92   06/04/18 (!) 154/106   06/04/18 (!) 144/97    Weight from Last 3 Encounters:   06/06/18 106.2 kg (234 lb 3.2 oz)   06/04/18 105.7 kg (233 lb)   06/04/18 105.7 kg (233 lb)               Primary Care Provider Fax #    Physician No Ref-Primary [phone]       No address on file        Equal Access to Services     LEONID BAILEY : Abhijit Burroughs, boston martel, jewell njalmaemmanuel justice, daisha crews . So [facility] [phone].    ATENCIÓN: Si habla español, tiene a craig disposición servicios gratuitos de asistencia lingüística. Llame al [phone].    We comply with applicable federal civil rights laws and [place] laws. We do not discriminate on the basis of race, color, national origin, age, disability, sex, sexual orientation, or gender identity.            Thank you!     Thank you for choosing [facility]  for your care. Our goal is always to provide you with excellent care. Hearing back from our patients is one way we can continue to improve our services. Please take a few minutes to complete the written survey that you may receive in the mail after your visit with us. Thank you!             Your Updated Medication List - Protect others around you: Learn how to safely use, store and throw away your medicines at www.disposemymeds.org.          This list is accurate as of 6/6/18  9:42 AM.  Always use your most recent med list.                   Brand Name Dispense Instructions for use Diagnosis    glipiZIDE 5 MG 24 hr tablet    GLUCOTROL XL     Take 1 tablet by mouth daily        metFORMIN 500 MG 24 hr tablet    GLUCOPHAGE-XR     Take 1 tablet by mouth daily        prednisoLONE acetate 1 % ophthalmic susp    PRED  FORTE     Place 1 drop into both eyes every morning

## 2018-06-06 NOTE — PROGRESS NOTES
Service Date: 06/06/2018      HISTORY OF PRESENT ILLNESS:  Vlad Calderon is 52 and he was referred by the emergency room after he went to the ER 2 days ago for a recent pattern of new-onset chest discomfort.  His symptoms tend to be upper chest.  They are non-radiating.  He cannot make them come.  He cannot necessarily make them go away.  They can last for over an hour.      The symptoms do not go to the neck, jaw, arms or back.  They do not seem to be exertional.  He had no other associated symptoms other than once he felt a little sweaty but at the time, he was working hard with some extra layers of clothes on, so it was not clear that there was a cause and effect.      He has never had heart disease or known coronary disease in the past.  He has been diabetic for a year and a half, treated with diet and with oral medications.      SOCIAL HISTORY:  He is , is not currently exercising much.  He does not have children.  He lives in an apartment and life has been hectic and busy so he has been physically less active.      FAMILY HISTORY:  Negative for premature coronary disease.      REVIEW OF SYSTEMS:  Negative for a 10-point review other than his diabetes as noted.      PHYSICAL EXAMINATION:   GENERAL:  Shows a well-developed, well-nourished male.  He has a little bit of some extra weight in the abdomen.   VITAL SIGNS:  Blood pressure was 140/90, heart rate 80.  He weighed 234 pounds.  Seven years ago, he weighed 210 pounds.  He says he has been exercising less and enjoying food more.  He has been on and off with taking good care of his diabetes and watching his carbohydrates and he has not been exercising substantially.   HEAD:  Normal.   NECK:  Free of neck vein distention, mass, bruit or goiter.  No adenopathy noted.   HEART:  Regular without gallop, murmur, rub or click.  Chest wall is nontender to vigorous palpation.   LUNGS:  Clear to auscultation.   ABDOMEN:  Soft without organomegaly, mass, pain or  "guarding.   EXTREMITIES:  Show +2 dorsalis pedis pulses, no edema.       Vlad Calderon is 52.  He is a hair overweight.  It looks like he has a little bit of a tendency to hypertension.  He is a non-insulin dependent diabetic, currently controlled on glipizide 5 mg a day and metformin 500 mg a day.  He is also taking prednisone for an eye issue but they are eyedrops.      His symptoms, I feel, are more atypical than typical of angina.  He does use the word \"tightness\" in the upper chest but it is not exertional and nonradiating.  He says he took an antacid not long ago and the pain went away about an hour later.  All this is a bit nondiagnostic in terms of the etiology of his chest discomfort.        His EKG showed sinus rhythm with a right bundle branch block.        With his visit to the emergency room, his laboratory work was normal.  Creatinine 0.7.  Troponins negative.  Hemoglobin 15 grams, white count 6000.  Electrolytes normal.      I have ordered a stress echo. I feel it will be normal.  His symptoms are atypical and I do not feel that they are due to significant coronary artery disease.  Should the study be as I expect, totally normal, I do not feel we need further cardiac testing.  I told him that I thought a GI assessment might have some value.      Should the test be blatantly abnormal or nondiagnostic, it may merit further discussion and assessment.        IMPRESSION:  For the time being, my impressions were:    1.  Noncardiac atypical chest discomfort, possibly GI in origin but I strongly doubt coronary artery disease/angina.   2.  Adult-onset non-insulin dependent diabetes.   3.  Lipid status unclear.   4.  Normal renal function.      PLAN:  Stress echo followed by prudent choices of diet, fitness, exercise, diabetic control, etc.  Over 50 minutes was spent doing the consult with well over half the time being face-to-face, education, counseling, assessment.  I reviewed multiple old records available " through the ER, x-ray, EKG, lab work, medications, medication side effects, etc.      cc:   Chart         CECY ANDERSON MD, [facility]             D: 2018   T: 2018   MT: JIAN      Name:     MELLISSA ECHEVARRIA   MRN:      [id number]        Account:      [id number]   :      1965           Service Date: 2018      Document: [id number]

## 2018-06-06 NOTE — LETTER
6/6/2018    Physician No Ref-Primary  No address on file    RE: Harsha Calderon       Dear Colleague,    I had the pleasure of seeing Harsha SHANIA Calderon in the [facility].    6/6/18 New w/ [place] post ED visit for atypical chest pain.   ED (in [facility]) 5/4/18- epigastric CP- intermit x 2 days with diaphoresis. Neg CXR, RBBB, Elevated BP without the diagnosis of HTN.  Normally CP around lunch, while sitting down. Exercise does NOT seem to exacerbate pain.     HPI and Plan:   See dictation        Orders Placed This Encounter   Procedures     Exercise Stress Echocardiogram     No orders of the defined types were placed in this encounter.    There are no discontinued medications.      Encounter Diagnoses   Name Primary?     Right bundle branch block Yes     Elevated BP without diagnosis of hypertension      Other chest pain      Atypical chest pain        CURRENT MEDICATIONS:  Current Outpatient Prescriptions   Medication Sig Dispense Refill     glipiZIDE (GLUCOTROL XL) 5 MG 24 hr tablet Take 1 tablet by mouth daily  0     metFORMIN (GLUCOPHAGE-XR) 500 MG 24 hr tablet Take 1 tablet by mouth daily       prednisoLONE acetate (PRED FORTE) 1 % ophthalmic susp Place 1 drop into both eyes every morning  0       ALLERGIES   No Known Allergies    PAST MEDICAL HISTORY:  Past Medical History:   Diagnosis Date     Allergic rhinitis     dust     Diabetes (H)        PAST SURGICAL HISTORY:  Past Surgical History:   Procedure Laterality Date     SINUS SURGERY  2004     TONSILLECTOMY  1987       FAMILY HISTORY:  Family History   Problem Relation Age of Onset     DIABETES No family hx of      CANCER No family hx of      C.A.D. No family hx of        SOCIAL HISTORY:  Social History     Social History     Marital status:      Spouse name: Hilary Smith     Number of children: 0     Years of education: 14     Occupational History      [employer]     Social History Main Topics      Smoking status: Never Smoker     Smokeless tobacco: Never Used     Alcohol use 6.0 oz/week     12 Standard drinks or equivalent per week      Comment: drinks once week     Drug use: No     Sexual activity: Yes     Partners: Female     Other Topics Concern     None     Social History Narrative         Review of Systems:  Skin:  Negative       Eyes:  Negative      ENT:  Negative      Respiratory:  Negative       Cardiovascular:  Negative      Gastroenterology: Positive for heartburn possible cause of CP  Genitourinary:  Negative      Musculoskeletal:  Negative      Neurologic:  Negative      Psychiatric:  Positive for excessive stress    Heme/Lymph/Imm:  Positive for allergies seasonal  Endocrine:  Positive for diabetes      Physical Exam:  Vitals: BP (!) 143/92  Pulse 82  Ht 1.829 m (6')  Wt 106.2 kg (234 lb 3.2 oz)  BMI 31.76 kg/m2    Constitutional:  cooperative, alert and oriented, well developed, well nourished, in no acute distress        Skin:  warm and dry to the touch          Head:  normocephalic        Eyes:  pupils equal and round;sclera white        Lymph:No Cervical lymphadenopathy present     ENT:  no pallor or cyanosis        Neck:  carotid pulses are full and equal bilaterally        Respiratory:  clear to auscultation         Cardiac: regular rhythm;no murmurs, gallops or rubs detected                              right dorsalis pedis artery;2+             2+;left dorsalis pedis artery            GI:  abdomen soft;no masses;non-tender        Extremities and Muscular Skeletal:  no edema;no deformities, clubbing, cyanosis, erythema observed              Neurological:  affect appropriate        Psych:           Recent Lab Results:  LIPID RESULTS:  No results found for: CHOL, HDL, LDL, TRIG, CHOLHDLRATIO    LIVER ENZYME RESULTS:  Lab Results   Component Value Date    AST 10 06/04/2018    ALT 37 06/04/2018       CBC RESULTS:  Lab Results   Component Value Date    WBC 6.3 06/04/2018    RBC 4.99  06/04/2018    HGB 15.3 06/04/2018    HCT 42.8 06/04/2018    MCV 86 06/04/2018    MCH 30.7 06/04/2018    MCHC 35.7 06/04/2018    RDW 12.2 06/04/2018     06/04/2018       BMP RESULTS:  Lab Results   Component Value Date     06/04/2018    POTASSIUM 4.2 06/04/2018    CHLORIDE 100 06/04/2018    CO2 30 06/04/2018    ANIONGAP 6 06/04/2018     (H) 06/04/2018    BUN 17 06/04/2018    CR 0.70 06/04/2018    GFRESTIMATED >90 06/04/2018    GFRESTBLACK >90 06/04/2018    GLADIS 9.0 06/04/2018        A1C RESULTS:  No results found for: A1C    INR RESULTS:  No results found for: INR        CC  No referring provider defined for this encounter.                    Thank you for allowing me to participate in the care of your patient.      Sincerely,     CECY ANDERSON MD     [facility]    cc:   No referring provider defined for this encounter.

## 2018-06-06 NOTE — PROGRESS NOTES
6/6/18 New w/ Anal post ED visit for atypical chest pain.   ED (in [facility]) 5/4/18- epigastric CP- intermit x 2 days with diaphoresis. Neg CXR, RBBB, Elevated BP without the diagnosis of HTN.  Normally CP around lunch, while sitting down. Exercise does NOT seem to exacerbate pain.     HPI and Plan:   See dictation        Orders Placed This Encounter   Procedures     Exercise Stress Echocardiogram     No orders of the defined types were placed in this encounter.    There are no discontinued medications.      Encounter Diagnoses   Name Primary?     Right bundle branch block Yes     Elevated BP without diagnosis of hypertension      Other chest pain      Atypical chest pain        CURRENT MEDICATIONS:  Current Outpatient Prescriptions   Medication Sig Dispense Refill     glipiZIDE (GLUCOTROL XL) 5 MG 24 hr tablet Take 1 tablet by mouth daily  0     metFORMIN (GLUCOPHAGE-XR) 500 MG 24 hr tablet Take 1 tablet by mouth daily       prednisoLONE acetate (PRED FORTE) 1 % ophthalmic susp Place 1 drop into both eyes every morning  0       ALLERGIES   No Known Allergies    PAST MEDICAL HISTORY:  Past Medical History:   Diagnosis Date     Allergic rhinitis     dust     Diabetes (H)        PAST SURGICAL HISTORY:  Past Surgical History:   Procedure Laterality Date     SINUS SURGERY  2004     TONSILLECTOMY  1987       FAMILY HISTORY:  Family History   Problem Relation Age of Onset     DIABETES No family hx of      CANCER No family hx of      C.A.D. No family hx of        SOCIAL HISTORY:  Social History     Social History     Marital status:      Spouse name: Hilary Smith     Number of children: 0     Years of education: 14     Occupational History      [employer]     Social History Main Topics     Smoking status: Never Smoker     Smokeless tobacco: Never Used     Alcohol use 6.0 oz/week     12 Standard drinks or equivalent per week      Comment: drinks once week     Drug use: No     Sexual  activity: Yes     Partners: Female     Other Topics Concern     None     Social History Narrative         Review of Systems:  Skin:  Negative       Eyes:  Negative      ENT:  Negative      Respiratory:  Negative       Cardiovascular:  Negative      Gastroenterology: Positive for heartburn possible cause of CP  Genitourinary:  Negative      Musculoskeletal:  Negative      Neurologic:  Negative      Psychiatric:  Positive for excessive stress    Heme/Lymph/Imm:  Positive for allergies seasonal  Endocrine:  Positive for diabetes      Physical Exam:  Vitals: BP (!) 143/92  Pulse 82  Ht 1.829 m (6')  Wt 106.2 kg (234 lb 3.2 oz)  BMI 31.76 kg/m2    Constitutional:  cooperative, alert and oriented, well developed, well nourished, in no acute distress        Skin:  warm and dry to the touch          Head:  normocephalic        Eyes:  pupils equal and round;sclera white        Lymph:No Cervical lymphadenopathy present     ENT:  no pallor or cyanosis        Neck:  carotid pulses are full and equal bilaterally        Respiratory:  clear to auscultation         Cardiac: regular rhythm;no murmurs, gallops or rubs detected                              right dorsalis pedis artery;2+             2+;left dorsalis pedis artery            GI:  abdomen soft;no masses;non-tender        Extremities and Muscular Skeletal:  no edema;no deformities, clubbing, cyanosis, erythema observed              Neurological:  affect appropriate        Psych:           Recent Lab Results:  LIPID RESULTS:  No results found for: CHOL, HDL, LDL, TRIG, CHOLHDLRATIO    LIVER ENZYME RESULTS:  Lab Results   Component Value Date    AST 10 06/04/2018    ALT 37 06/04/2018       CBC RESULTS:  Lab Results   Component Value Date    WBC 6.3 06/04/2018    RBC 4.99 06/04/2018    HGB 15.3 06/04/2018    HCT 42.8 06/04/2018    MCV 86 06/04/2018    MCH 30.7 06/04/2018    MCHC 35.7 06/04/2018    RDW 12.2 06/04/2018     06/04/2018       BMP RESULTS:  Lab Results    Component Value Date     06/04/2018    POTASSIUM 4.2 06/04/2018    CHLORIDE 100 06/04/2018    CO2 30 06/04/2018    ANIONGAP 6 06/04/2018     (H) 06/04/2018    BUN 17 06/04/2018    CR 0.70 06/04/2018    GFRESTIMATED >90 06/04/2018    GFRESTBLACK >90 06/04/2018    GLADIS 9.0 06/04/2018        A1C RESULTS:  No results found for: A1C    INR RESULTS:  No results found for: INR        CC  No referring provider defined for this encounter.

## 2018-06-18 ENCOUNTER — PATIENT OUTREACH (OUTPATIENT)
Dept: CARE COORDINATION | Facility: CLINIC | Age: 53
End: 2018-06-18

## 2018-06-18 ASSESSMENT — ACTIVITIES OF DAILY LIVING (ADL): DEPENDENT_IADLS:: INDEPENDENT

## 2018-06-18 NOTE — PROGRESS NOTES
Clinical Product Navigator RN reviewed chart; patient on payer product coverage.  Review results: Met referral criteria for Care Coordinator; referral to be sent.      Pt needs to establish care for continued follow up of chronic conditions.  Pt had a visit but was sent to ED.  Pt will need to be followed for care of DM.     Lolis Kraus RN/Clinical Product Navigator

## 2018-06-18 NOTE — PROGRESS NOTES
Clinic Care Coordination Contact    Clinic Care Coordination Contact  OUTREACH    Referral Information:  Referral Source: [employer] Outreach    Primary Diagnosis: Cardiovascular - other    Chief Complaint   Patient presents with     Clinic Care Coordination - Initial        Universal Utilization: Appropriate Utilization at this time. Patient to call for an appointment at the clinic to establish care. Patient would like to stay within the [facility] network.   Difficulty keeping appointments:: No  Utilization    Last refreshed: 6/18/2018  9:28 AM:  No Show Count (past year) 0       Last refreshed: 6/18/2018  9:28 AM:  ED visits 1       Last refreshed: 6/18/2018  9:28 AM:  Hospital admissions 0          Current as of: 6/18/2018  9:28 AM             Clinical Concerns:  Current Medical Concerns: Patient who was recently in the ED due to some chest pain. Patient had a follow up appointment with Cardiology who will undergo a  Stress test this coming week. Patient has not experienced any more chest pain since his ED visit. Patient stated the cardiologist does not believe anything will come about but wants to rule it all out. Patient does also have diabetes for which he is on oral medication. Patient stated he has had better control since he is eating correctly and exercising. Patient feels he needs to establish care with a PCP to help with his diabetes management/medications. RN CC will check with the patient in 1 week to assure an appointment is made. Patient denied any other medical concerns at this time.      Current Behavioral Concerns: No behavioral concerns at this time.     Education Provided to patient: Care Coordination. Diabetes referrals. Clinic Education.    Pain  Chronic pain (GOAL):: No  Health Maintenance Reviewed: Due/Overdue     Medication Management:  Reviewed. No concerns at this time. Patient did state he needs to get into the clinic for a follow up due to his diabetic medication needing a refill.      Functional Status:  Dependent ADLs:: Ambulation-no assistive device  Dependent IADLs:: Independent  Bed or wheelchair confined:: No  Mobility Status: Independent  Fallen 2 or more times in the past year?: No  Any fall with injury in the past year?: No    Living Situation:  Current living arrangement:: I live in a private home with spouse  Type of residence:: Apartment    Diet/Exercise/Sleep:  Diet:: Diabetic diet  Inadequate nutrition (GOAL):: No  Food Insecurity: No  Exercise:: Yes  Days per week of moderate to strenuous exercise (like a brisk walk): 5  On average, minutes per day of exercise at this level: 40  How intense was your typical exercise? : Moderate (like brisk walking)  Exercise Minutes per Week: 200  Inadequate activity/exercise (GOAL):: No  Significant changes in sleep pattern (GOAL): No    Transportation:  Transportation concerns (GOAL):: No  Transportation means:: Regular car     Psychosocial:  Congregational or spiritual beliefs that impact treatment:: No  Mental health DX:: No  Mental health management concern (GOAL):: No  Informal Support system:: Spouse     Financial/Insurance: [employer]   Financial/Insurance concerns (GOAL):: No       Resources and Interventions:  Equipment Currently Used at Home: none    Advance Care Plan/Directive  Advanced Care Plans/Directives on file:: No  Advanced Care Plan/Directive Status: Declined Further Information          Goals:   Goals        General    Being Active (pt-stated)     Notes - Note created  6/18/2018 12:18 PM by Purvi Bhatt RN    Goal Statement: I want to continue to exercise to help manage my diabetes.   Measure of Success: Patient will continue to lose weight  Supportive Steps to Achieve: Diabetes education on exercise.   Barriers: Busy life   Strengths: Motivated for a change   Date to Achieve By: Ongoing               Patient/Caregiver understanding: Patient verbalized understanding, engaged in AIDET communication behavior  during encounter.    Outreach Frequency: monthly  Future Appointments              In 2 days [id number] CV Echocardiography, CVIMG          Plan:   1. RN CC will reach out in 1 week to make sure the patient has made a follow up appointment.   2. RN CC will touch base with the patient moving forward to address any medical concerns and to check in with his exercise routine.     Wanda Garcia RN  Clinic Care Coordinator  [phone]  [email or web address]

## 2018-06-18 NOTE — LETTER
[facility]  Complex Care Plan  About Me  Patient Name:  Harsha Calderon    YOB: 1965  Age:     52 year old   [facility] MRN:   [id number] Telephone Information:    Home Phone [phone]   Mobile [phone]       Address:    [address] [place] [address] Email address:  [email or web address]      Emergency Contact(s)  Name Relationship Lgl Grd Work Phone Home Phone Mobile Phone   REID TELLEZ Spouse   [phone]            Primary language:  English     needed? No   [facility] [employer]:  [phone] op. 1  Other communication barriers:    Preferred Method of Communication:  Mail  Current living arrangement: I live in a private home with spouse  Mobility Status/ Medical Equipment: Independent    Health Maintenance  Health Maintenance Reviewed: Due/Overdue     My Access Plan  Medical Emergency 911   Primary Clinic Line [facility] - [phone]   24 Hour Appointment Line [phone] or  [phone]) (toll-free)   24 Hour Nurse Line [phone] (toll-free)   Preferred Urgent Care [facility], [phone]   Preferred Hospital [facility]  [phone]   Preferred Pharmacy [facility] [id number] [place] [address] [address] AT [address] [place] [address]     Behavioral Health Crisis Line The National Suicide Prevention Lifeline at 1-494.961.6769 or 091     My Care Team Members    Patient Care Team       Relationship Specialty Notifications Start End    No Ref-Primary, Physician PCP - General   4/1/18     Fax: [phone]         Purvi Bhatt, RN Lead Care Coordinator Primary Care - CC  6/18/18     Phone: [phone] Fax: [phone]                My Care Plans  Self Management and Treatment Plan  Goals and (Comments)  Goals        General    Being Active (pt-stated)     Notes - Note created  6/18/2018 12:18 PM by Purvi Bhatt, RN    Goal Statement: I want to  continue to exercise to help manage my diabetes.   Measure of Success: Patient will continue to lose weight  Supportive Steps to Achieve: Diabetes education on exercise.   Barriers: Busy life   Strengths: Motivated for a change   Date to Achieve By: Ongoing              Action Plans on File:                       Advance Care Plans/Directives Type:        My Medical and Care Information  Problem List   Patient Active Problem List   Diagnosis     CARDIOVASCULAR SCREENING; LDL GOAL LESS THAN 160     Allergic rhinitis     Elevated BP without diagnosis of hypertension     Type 2 diabetes mellitus with complication, without long-term current use of insulin (H)     Right bundle branch block      Current Medications and Allergies:  See printed Medication Report.    Care Coordination Start Date: 6/18/2018   Frequency of Care Coordination: monthly   Form Last Updated: 06/18/2018

## 2018-06-18 NOTE — LETTER
[place] CARE COORDINATION  [facility]    June 18, 2018    Harsha Calderon  [address] [place] [address]      Dear Harsha,    I am a clinic care coordinator who works at [facility]. I wanted to thank you for spending the time to talk with me. I wanted to provide you with my contact information so that you can call me with questions or concerns about your health care. Below is a description of clinic care coordination and how I can further assist you.     The clinic care coordinator is a registered nurse and/or  who understand the health care system. The goal of clinic care coordination is to help you manage your health and improve access to the [facility] in the most efficient manner. The registered nurse can assist you in meeting your health care goals by providing education, coordinating services, and strengthening the communication among your providers. The  can assist you with financial, behavioral, psychosocial, chemical dependency, counseling, and/or psychiatric resources.    Please feel free to contact me at [phone], with any questions or concerns. We at [place] are focused on providing you with the highest-quality healthcare experience possible and that all starts with you.     Sincerely,     Purvi Bhatt RN

## 2018-06-20 ENCOUNTER — HOSPITAL ENCOUNTER (OUTPATIENT)
Dept: CARDIOLOGY | Facility: CLINIC | Age: 53
Discharge: HOME | End: 2018-06-20
Attending: INTERNAL MEDICINE | Admitting: INTERNAL MEDICINE
Payer: COMMERCIAL

## 2018-06-20 DIAGNOSIS — R07.89 OTHER CHEST PAIN: ICD-10-CM

## 2018-06-20 DIAGNOSIS — I45.10 RIGHT BUNDLE BRANCH BLOCK: ICD-10-CM

## 2018-06-20 DIAGNOSIS — R07.89 ATYPICAL CHEST PAIN: ICD-10-CM

## 2018-06-20 DIAGNOSIS — R03.0 ELEVATED BP WITHOUT DIAGNOSIS OF HYPERTENSION: ICD-10-CM

## 2018-06-20 PROCEDURE — 93016 CV STRESS TEST SUPVJ ONLY: CPT | Performed by: INTERNAL MEDICINE

## 2018-06-20 PROCEDURE — 93018 CV STRESS TEST I&R ONLY: CPT | Performed by: INTERNAL MEDICINE

## 2018-06-20 PROCEDURE — 93325 DOPPLER ECHO COLOR FLOW MAPG: CPT | Mod: 26 | Performed by: INTERNAL MEDICINE

## 2018-06-20 PROCEDURE — 93350 STRESS TTE ONLY: CPT | Mod: 26 | Performed by: INTERNAL MEDICINE

## 2018-06-20 PROCEDURE — 93325 DOPPLER ECHO COLOR FLOW MAPG: CPT | Mod: TC

## 2018-06-20 PROCEDURE — 93321 DOPPLER ECHO F-UP/LMTD STD: CPT | Mod: 26 | Performed by: INTERNAL MEDICINE

## 2018-06-21 ENCOUNTER — TELEPHONE (OUTPATIENT)
Dept: CARDIOLOGY | Facility: CLINIC | Age: 53
End: 2018-06-21

## 2018-06-22 NOTE — TELEPHONE ENCOUNTER
Interpretation Summary  The study was technically difficult. This was a normal stress echocardiogram  with no evidence of stress-induced ischemia.  _____________________________________________________________________________    Patient notified of normal stress echo results and follow up with Cardiology if questions or concerns. Patient verbalized understanding and plan.  Will forward to Dr. Pedro DANIEL

## 2018-08-28 ENCOUNTER — PATIENT OUTREACH (OUTPATIENT)
Dept: CARE COORDINATION | Facility: CLINIC | Age: 53
End: 2018-08-28

## 2018-08-28 NOTE — LETTER
[place] CARE COORDINATION    August 31, 2018    Harsha Calderon  [address] [place] [id number]      Dear Harsha,    I am a clinic care coordinator who works with Berkley Velasquez MD at [facility]. I wanted to provide you with contact information to speak with a care coordinator so you can call if you have questions regarding your health care. I am transitioning out of this role within the clinic but my coworkers would be able to assist you.  Below is a description of clinic care coordination and how I can further assist you.     The clinic care coordinator is a registered nurse and/or  who understand the health care system. The goal of clinic care coordination is to help you manage your health and improve access to the [facility] system in the most efficient manner. The registered nurse can assist you in meeting your health care goals by providing education, coordinating services, and strengthening the communication among your providers. The  can assist you with financial, behavioral, psychosocial, chemical dependency, counseling, and/or psychiatric resources.    Please contact the clinic at [phone], with any questions or concerns. We at [facility] are focused on providing you with the highest-quality healthcare experience possible and that all starts with you.     Sincerely,     Purvi Bhatt RN Care Coordinator

## 2018-08-31 NOTE — PROGRESS NOTES
Clinic Care Coordination Contact  [facility]/Voicemail       Clinical Data: Care Coordinator Outreach  Outreach attempted x 2.  Left message on voicemail with call back information and requested return call.  Plan: Care Coordinator mailed out care coordination introduction letter on 08/31/18  . Care Coordinator will do no further outreaches at this time.    Wanda Garcia RN  Clinic Care Coordinator  [phone]  [email or web address]

## 2019-10-08 ENCOUNTER — HOSPITAL ENCOUNTER (OUTPATIENT)
Facility: CLINIC | Age: 54
End: 2019-10-08
Attending: COLON & RECTAL SURGERY | Admitting: COLON & RECTAL SURGERY
Payer: COMMERCIAL

## 2019-10-22 RX ORDER — ONDANSETRON 2 MG/ML
4 INJECTION INTRAMUSCULAR; INTRAVENOUS
Status: CANCELLED | OUTPATIENT
Start: 2019-10-22

## 2019-10-22 RX ORDER — LIDOCAINE 40 MG/G
CREAM TOPICAL
Status: CANCELLED | OUTPATIENT
Start: 2019-10-22

## 2020-02-24 ENCOUNTER — HEALTH MAINTENANCE LETTER (OUTPATIENT)
Age: 55
End: 2020-02-24

## 2020-12-13 ENCOUNTER — HEALTH MAINTENANCE LETTER (OUTPATIENT)
Age: 55
End: 2020-12-13

## 2021-04-17 ENCOUNTER — HEALTH MAINTENANCE LETTER (OUTPATIENT)
Age: 56
End: 2021-04-17

## 2021-08-01 ENCOUNTER — HEALTH MAINTENANCE LETTER (OUTPATIENT)
Age: 56
End: 2021-08-01

## 2021-09-26 ENCOUNTER — HEALTH MAINTENANCE LETTER (OUTPATIENT)
Age: 56
End: 2021-09-26

## 2022-03-13 ENCOUNTER — HEALTH MAINTENANCE LETTER (OUTPATIENT)
Age: 57
End: 2022-03-13

## 2022-05-08 ENCOUNTER — HEALTH MAINTENANCE LETTER (OUTPATIENT)
Age: 57
End: 2022-05-08

## 2023-01-14 ENCOUNTER — HEALTH MAINTENANCE LETTER (OUTPATIENT)
Age: 58
End: 2023-01-14

## 2023-06-02 ENCOUNTER — HEALTH MAINTENANCE LETTER (OUTPATIENT)
Age: 58
End: 2023-06-02

## 2023-07-16 ENCOUNTER — HEALTH MAINTENANCE LETTER (OUTPATIENT)
Age: 58
End: 2023-07-16

## 2024-02-11 ENCOUNTER — HEALTH MAINTENANCE LETTER (OUTPATIENT)
Age: 59
End: 2024-02-11